# Patient Record
Sex: MALE | Race: WHITE | NOT HISPANIC OR LATINO | Employment: OTHER | URBAN - METROPOLITAN AREA
[De-identification: names, ages, dates, MRNs, and addresses within clinical notes are randomized per-mention and may not be internally consistent; named-entity substitution may affect disease eponyms.]

---

## 2022-11-01 ENCOUNTER — TRANSFERRED RECORDS (OUTPATIENT)
Dept: HEALTH INFORMATION MANAGEMENT | Facility: CLINIC | Age: 57
End: 2022-11-01

## 2022-11-08 ENCOUNTER — MEDICAL CORRESPONDENCE (OUTPATIENT)
Dept: HEALTH INFORMATION MANAGEMENT | Facility: CLINIC | Age: 57
End: 2022-11-08

## 2022-11-10 ENCOUNTER — TRANSCRIBE ORDERS (OUTPATIENT)
Dept: OTHER | Age: 57
End: 2022-11-10

## 2022-11-10 DIAGNOSIS — R13.14 PHARYNGOESOPHAGEAL DYSPHAGIA: Primary | ICD-10-CM

## 2022-11-10 DIAGNOSIS — T27.0XXS: ICD-10-CM

## 2023-02-13 ENCOUNTER — PATIENT OUTREACH (OUTPATIENT)
Dept: OTOLARYNGOLOGY | Facility: CLINIC | Age: 58
End: 2023-02-13
Payer: COMMERCIAL

## 2023-02-13 NOTE — PROGRESS NOTES
Tried to call patient to offer an appointment for tomorrow, as we received an urgent referral. Unfortunately the patient does not have a voicemail or MyChart. Will trying calling again. Viridiana White RN on 2/13/2023 at 3:53 PM

## 2023-02-17 ENCOUNTER — PATIENT OUTREACH (OUTPATIENT)
Dept: OTOLARYNGOLOGY | Facility: CLINIC | Age: 58
End: 2023-02-17
Payer: COMMERCIAL

## 2023-02-17 NOTE — PROGRESS NOTES
Tried contacting patient as a follow up from a referral. Offered him an appointment on 2/23 at 4:00 pm and asked him to give a callback to schedule or find a different appointment. Give phone number. Viridiana White RN on 2/17/2023 at 10:48 AM

## 2023-02-20 NOTE — TELEPHONE ENCOUNTER
FUTURE VISIT INFORMATION      FUTURE VISIT INFORMATION:    Date: 2/23/23    Time: 4pm    Location: [facility]  REFERRAL INFORMATION:    Referring provider:  Scotty Castillo MD    [facility]     Reason for visit/diagnosis  Pharyngoesophageal dysphagia Burn of larynx, sequela    RECORDS REQUESTED FROM:       Clinic name Comments Records Status Imaging Status   [facility] [place] 2/10/23- note with Maria A Peña MD [facility]     [facility] 11/22/22- [place] Video Swallow study   11/1/22- [place] Esophagram   9/27/22- XR Portable chest   9/23/22- CT Chest     Procedure:  2/8/23- Nikki Esophagogastroduodenoscopy     Office visit:  11/22/22- note with Melisa Morris SLP  11/18/22- Note with Miriam Valero MD  11/8/22- note with Scotty Castillo MD  10/24/22- note with  Kevan Vargas M, MD  9/23/22-  ED [place] MD CE 2/20/23- pending req  -PACS                             February 20, 2023 9:37 AM Called [facility] to push images to [place] PACS- Oliva   February 20, 2023 10:10 AM - Received image in pacs and attached it to patient- Oliva

## 2023-02-23 ENCOUNTER — VIRTUAL VISIT (OUTPATIENT)
Dept: OTOLARYNGOLOGY | Facility: CLINIC | Age: 58
End: 2023-02-23
Payer: COMMERCIAL

## 2023-02-23 ENCOUNTER — TELEPHONE (OUTPATIENT)
Dept: OTOLARYNGOLOGY | Facility: CLINIC | Age: 58
End: 2023-02-23

## 2023-02-23 ENCOUNTER — PRE VISIT (OUTPATIENT)
Dept: OTOLARYNGOLOGY | Facility: CLINIC | Age: 58
End: 2023-02-23

## 2023-02-23 DIAGNOSIS — R13.14 PHARYNGOESOPHAGEAL DYSPHAGIA: Primary | ICD-10-CM

## 2023-02-23 PROCEDURE — 99204 OFFICE O/P NEW MOD 45 MIN: CPT | Mod: VID | Performed by: OTOLARYNGOLOGY

## 2023-02-23 NOTE — PROGRESS NOTES
Derrell is a 57 year old who is being evaluated via a billable video visit.      How would you like to obtain your AVS? MyChart  If the video visit is dropped, the invitation should be resent by: Text to cell phone: [phone]  Will anyone else be joining your video visit? No        Video-Visit Details    Type of service:  Video Visit   Video Start Time: 2:30  Video End Time:2:45    Originating Location (pt. Location): Home    Distant Location (provider location):  Off-site  Platform used for Video Visit: [facility]   at the [facility]   Otolaryngology Clinic     Patient: Derrell Beebe    MRN: [id number]    : 1965    Age/Gender: 57 year old male  Date of Service: 2023  Rendering Provider:   Diana Oviedo MD     Referring Provider   PCP: No Ref-Primary, Physician  Referring Physician: No referring provider defined for this encounter.  Reason for Consultation   Caustic ingestion (KOH)  Esophageal stricture  Dysphaiga  History   HISTORY OF PRESENT ILLNESS: I was asked to consult on Derrell Beebe, by Dr. Peña for evaluation of dysphagia . Mr. Beebe is a 57 year old male who presents to us today with dysphagia in the setting of esophageal stricture of caustic ingestion.      Of note,     Today, he, presents for evaluation. he reports:  - 2022  Injury  Dilation at the [facility]  Did see speech therapy and got exercises  He can sip water  He was able to eat after the last dilation - was eatin foods  Water is no longer slipping down the wrong the tube  The longer he goes between dilations the harder it gets  He has to spit up his saliva   Last dilation was a week past last Wednesday  Another one tomorrow  4-5 days he was able to eat one meal per day   Was going well through the top part   A few times with the bread it went ok  He is on TPN   He was able to swallow his saliva   Now he can barely swallow water  Dr Jordan   The voice  changed a bit  Breathing is good     PAST MEDICAL HISTORY: No past medical history on file.    PAST SURGICAL HISTORY: No past surgical history on file.    CURRENT MEDICATIONS: No current outpatient medications on file.    ALLERGIES: Patient has no known allergies.    SOCIAL HISTORY:    Social History     Socioeconomic History     Marital status: Single     Spouse name: Not on file     Number of children: Not on file     Years of education: Not on file     Highest education level: Not on file   Occupational History     Not on file   Tobacco Use     Smoking status: Not on file     Smokeless tobacco: Not on file   Substance and Sexual Activity     Alcohol use: Not on file     Drug use: Not on file     Sexual activity: Not on file   Other Topics Concern     Not on file   Social History Narrative     Not on file     Social Determinants of Health     Financial Resource Strain: Not on file   Food Insecurity: Not on file   Transportation Needs: Not on file   Physical Activity: Not on file   Stress: Not on file   Social Connections: Not on file   Intimate Partner Violence: Not on file   Housing Stability: Not on file         FAMILY HISTORY: No family history on file.  Non-contributory for problems with anesthesia    REVIEW OF SYSTEMS:   The patient was asked a 14 point review of systems regarding constitutional symptoms, eye symptoms, ears, nose, mouth, throat symptoms, cardiovascular symptoms, respiratory symptoms, gastrointestinal symptoms, genitourinary symptoms, musculoskeletal symptoms, integumentary symptoms, neurological symptoms, psychiatric symptoms, endocrine symptoms, hematologic/lymphatic symptoms, and allergic/ immunologic symptoms.   The pertinent factors have been included in the HPI and below.  Patient Supplied Answers to Review of Systems  No flowsheet data found.    Physical Examination   The patient underwent a physical examination as described below. The pertinent positive and negative findings are  summarized after the description of the examination.    Constitutional: The patient's developmental and nutritional status was assessed. The patient's voice quality was assessed.  Head and Face: The head and face were inspected for deformities. Facial muscle strength was assessed bilaterally.  Eyes: Extraocular movements and primary gaze alignment were assessed.  Ears, Nose, Mouth and Throat: The ears and nose were examined for deformities.The lips were examined for abnormalities.   Neck: The neck was visualized for abnormal neck masses  Respiratory: The nature of the breathing was observed.  Extremities: The hand extremities were examined for any clubbing or cyanosis.  Skin: The skin was examined for inflammatory or neoplastic conditions.  Neurologic: The patient's orientation, mood, and affect were noted. The cranial nerve  functions were examined.  Other pertinent positive and negative findings on physical examination:   Breathing comfortably on room air, no stridor with deep inspiration  no throat clearing throughout the visit   Spitting up saliva    All other physical examination findings were within normal limits and noncontributory     Review of Relevant Clinical Data   I personally reviewed:  Notes:   [facility]. Peña 2[facility]10/23  [facility] 57[facility] seen for evaluation of caustic ingestion of lye (KOH) with burns to the esophagus and the upper aerodigestive tract. This happened in September of this year. Patient apparently missed took a glass with clear liquids for a glass of water and accidentally drank it, immediately throwing it back up. He was not seen for 3 days later. Patient has been seen by GI and has had dilation. He has a severe stricture distally. There was concerned because he is on TPN currently unable to swallow only liquids. He feels that he may be aspirating. He has not had a swallow study through speech therapy. He feels that his voice is back to baseline although initially  he was very hoarse. He did not require tracheotomy. He is not tried solid foods since the incident. When he swallows, he feels he has to attempt 5 or 6 times before he can get food to go down the back of his throat. He did not have this problem previous to the ingestion.     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  Multiple benign-appearing, intrinsic severe stenoses   were found in the entire esophagus. The stenoses were   traversed. A guide wire was placed, then the scope   was withdrawn. Using the wire as a guide, dilation   with a 12-13.5-15 mm balloon dilator was performed to   13.5 mm under fluoroscopic guidance.     Radiology:   Xray Video Swallow Exam 22    Pathology:    Procedures:     Labs:  No results found for: TSH  No results found for: NA, CO2, BUN, CREAT, GLUCOSE, PHOS  No results found for: WBC, HGB, HCT, MCV, PLT  No results found for: PT, PTT, INR  No results found for: HENRY  No components found for: RHEUMATOIDFACTOR,  RF  No results found for: CRP  No components found for: CKTOT, URICACID  No components found for: C3, C4, DSDNAAB, NDNAABIFA  No results found for: MPOAB    Patient reported Quality of Life (QOL) Measures   Patient Supplied Answers To VHI Questionnaire  No flowsheet data found.      Patient Supplied Answers To EAT Questionnaire  No flowsheet data found.      Patient Supplied Answers To CSI Questionnaire  No flowsheet data found.      Patient Supplied Answers to Dyspnea Index Questionnaire:  No flowsheet data found.    Impression & Plan     IMPRESSION: Mr. Beebe is a 57 year old male who is being seen for the followin. Dysphagia  - in the setting of caustic ingestion fall of   - severe esophageal stricture distally per Dr Peña notes  - on TPN  - reports getting EGDs with dilation every other week  - after the last dilation was able to eat foods for 4-5 days  - now back to having issues and spitting up saliva  - had Xray Video  Swallow Exam 11/2022 with pharyngeal weakness and question of UES dysfunction   - discussed obtaining repeat Xray Video Swallow Exam with esophagram after tomorrow's repeat dilation  Plan  - Xray Video Swallow Exam with esophagram next week  - obtain records of dilations    2. Dysphonia  - scope by Dr Peña showed - pooling of saliva, right vocal fold paresis, OP scarring  - voice is slightly changed from normal  - does not bother him  - denies breathing issues  Plan  - needs scope       RETURN VISIT: 3/7 at 10am    Thank you for the kind referral and for allowing me to share in the care of Mr. Beebe. If you have any questions, please do not hesitate to contact me.    Diana Oviedo MD    Laryngology    [facility]  Department of  Otolaryngology - Head and Neck Surgery  [facility] [place] [address]  Appointment line: [phone]  Fax: [phone]      30 minutes spent on the date of the encounter doing chart review, interpretation of tests, patient visit and documentation

## 2023-02-23 NOTE — LETTER
2023       RE: Derrell Beebe  [address] [facility] [address]     Dear Colleague,    Thank you for referring your patient, Derrell Beebe, to the [facility] EAR NOSE AND THROAT CLINIC [place] [facility]. Please see a copy of my visit note below.    Derrell is a 57 year old who is being evaluated via a billable video visit.      How would you like to obtain your AVS? MyChart  If the video visit is dropped, the invitation should be resent by: Text to cell phone: [phone]  Will anyone else be joining your video visit? No        Video-Visit Details    Type of service:  Video Visit   Video Start Time: 2:30  Video End Time:2:45    Originating Location (pt. Location): Home    Distant Location (provider location):  Off-site  Platform used for Video Visit: [facility]   at the [facility]   Otolaryngology Clinic     Patient: Derrell Beebe    MRN: [id number]    : 1965    Age/Gender: 57 year old male  Date of Service: 2023  Rendering Provider:   Diana Oviedo MD     Referring Provider   PCP: No Ref-Primary, Physician  Referring Physician: No referring provider defined for this encounter.  Reason for Consultation   Caustic ingestion (KOH)  Esophageal stricture  Dysphaiga  History   HISTORY OF PRESENT ILLNESS: I was asked to consult on Derrell Beebe, by Dr. Peña for evaluation of dysphagia . Mr. Beebe is a 57 year old male who presents to us today with dysphagia in the setting of esophageal stricture of caustic ingestion.      Of note,     Today, he, presents for evaluation. he reports:  - 2022  Injury  Dilation at the [facility]  Did see speech therapy and got exercises  He can sip water  He was able to eat after the last dilation - was eatin foods  Water is no longer slipping down the wrong the tube  The longer he goes between dilations the harder it gets  He  has to spit up his saliva   Last dilation was a week past last Wednesday  Another one tomorrow  4-5 days he was able to eat one meal per day   Was going well through the top part   A few times with the bread it went ok  He is on TPN   He was able to swallow his saliva   Now he can barely swallow water  Dr Jordan   The voice changed a bit  Breathing is good     PAST MEDICAL HISTORY: No past medical history on file.    PAST SURGICAL HISTORY: No past surgical history on file.    CURRENT MEDICATIONS: No current outpatient medications on file.    ALLERGIES: Patient has no known allergies.    SOCIAL HISTORY:    Social History     Socioeconomic History     Marital status: Single     Spouse name: Not on file     Number of children: Not on file     Years of education: Not on file     Highest education level: Not on file   Occupational History     Not on file   Tobacco Use     Smoking status: Not on file     Smokeless tobacco: Not on file   Substance and Sexual Activity     Alcohol use: Not on file     Drug use: Not on file     Sexual activity: Not on file   Other Topics Concern     Not on file   Social History Narrative     Not on file     Social Determinants of Health     Financial Resource Strain: Not on file   Food Insecurity: Not on file   Transportation Needs: Not on file   Physical Activity: Not on file   Stress: Not on file   Social Connections: Not on file   Intimate Partner Violence: Not on file   Housing Stability: Not on file         FAMILY HISTORY: No family history on file.  Non-contributory for problems with anesthesia    REVIEW OF SYSTEMS:   The patient was asked a 14 point review of systems regarding constitutional symptoms, eye symptoms, ears, nose, mouth, throat symptoms, cardiovascular symptoms, respiratory symptoms, gastrointestinal symptoms, genitourinary symptoms, musculoskeletal symptoms, integumentary symptoms, neurological symptoms, psychiatric symptoms, endocrine symptoms, hematologic/lymphatic  symptoms, and allergic/ immunologic symptoms.   The pertinent factors have been included in the HPI and below.  Patient Supplied Answers to Review of Systems  No flowsheet data found.    Physical Examination   The patient underwent a physical examination as described below. The pertinent positive and negative findings are summarized after the description of the examination.    Constitutional: The patient's developmental and nutritional status was assessed. The patient's voice quality was assessed.  Head and Face: The head and face were inspected for deformities. Facial muscle strength was assessed bilaterally.  Eyes: Extraocular movements and primary gaze alignment were assessed.  Ears, Nose, Mouth and Throat: The ears and nose were examined for deformities.The lips were examined for abnormalities.   Neck: The neck was visualized for abnormal neck masses  Respiratory: The nature of the breathing was observed.  Extremities: The hand extremities were examined for any clubbing or cyanosis.  Skin: The skin was examined for inflammatory or neoplastic conditions.  Neurologic: The patient's orientation, mood, and affect were noted. The cranial nerve  functions were examined.  Other pertinent positive and negative findings on physical examination:   Breathing comfortably on room air, no stridor with deep inspiration  no throat clearing throughout the visit   Spitting up saliva    All other physical examination findings were within normal limits and noncontributory     Review of Relevant Clinical Data   I personally reviewed:  Notes:   Dr. Peña 2/10/23  [facility]  Derrell is a 57-year-old gentleman seen for evaluation of caustic ingestion of lye (KOH) with burns to the esophagus and the upper aerodigestive tract. This happened in September of this year. Patient apparently missed took a glass with clear liquids for a glass of water and accidentally drank it, immediately throwing it back up. He was not seen for 3 days later.  Patient has been seen by GI and has had dilation. He has a severe stricture distally. There was concerned because he is on TPN currently unable to swallow only liquids. He feels that he may be aspirating. He has not had a swallow study through speech therapy. He feels that his voice is back to baseline although initially he was very hoarse. He did not require tracheotomy. He is not tried solid foods since the incident. When he swallows, he feels he has to attempt 5 or 6 times before he can get food to go down the back of his throat. He did not have this problem previous to the ingestion.     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  Multiple benign-appearing, intrinsic severe stenoses   were found in the entire esophagus. The stenoses were   traversed. A guide wire was placed, then the scope   was withdrawn. Using the wire as a guide, dilation   with a 12-13.5-15 mm balloon dilator was performed to   13.5 mm under fluoroscopic guidance.     Radiology:   Xray Video Swallow Exam 11/22/22    Pathology:    Procedures:     Labs:  No results found for: TSH  No results found for: NA, CO2, BUN, CREAT, GLUCOSE, PHOS  No results found for: WBC, HGB, HCT, MCV, PLT  No results found for: PT, PTT, INR  No results found for: HENRY  No components found for: RHEUMATOIDFACTOR,  RF  No results found for: CRP  No components found for: CKTOT, URICACID  No components found for: C3, C4, DSDNAAB, NDNAABIFA  No results found for: MPOAB    Patient reported Quality of Life (QOL) Measures   Patient Supplied Answers To VHI Questionnaire  No flowsheet data found.      Patient Supplied Answers To EAT Questionnaire  No flowsheet data found.      Patient Supplied Answers To CSI Questionnaire  No flowsheet data found.      Patient Supplied Answers to Dyspnea Index Questionnaire:  No flowsheet data found.    Impression & Plan     IMPRESSION: Mr. Beebe is a 57 year old male who is being seen for the  followin. Dysphagia  - in the setting of caustic ingestion   - severe esophageal stricture distally per Dr Peña notes  - on TPN  - reports getting EGDs with dilation every other week  - after the last dilation was able to eat foods for 4-5 days  - now back to having issues and spitting up saliva  - had Xray Video Swallow Exam 2022 with pharyngeal weakness and question of UES dysfunction   - discussed obtaining repeat Xray Video Swallow Exam with esophagram after tomorrow's repeat dilation  Plan  - Xray Video Swallow Exam with esophagram next week  - obtain records of dilations    2. Dysphonia  - scope by Dr Peña showed - pooling of saliva, right vocal fold paresis, OP scarring  - voice is slightly changed from normal  - does not bother him  - denies breathing issues  Plan  - needs scope       RETURN VISIT: 3/7 at 10am    Thank you for the kind referral and for allowing me to share in the care of Mr. Beebe. If you have any questions, please do not hesitate to contact me.    Diana Oviedo MD    Laryngology    [facility]  Department of  Otolaryngology - Head and Neck Surgery  Clinics & Surgery Center  [address] [place] [address]  Appointment line: [phone]  Fax: [phone]      30 minutes spent on the date of the encounter doing chart review, interpretation of tests, patient visit and documentation        Again, thank you for allowing me to participate in the care of your patient.      Sincerely,    Diana Oviedo MD

## 2023-02-23 NOTE — PATIENT INSTRUCTIONS
1.  You were seen in the ENT Clinic today by . If you have any questions or concerns after your appointment, please call [phone]. Press option #1 for scheduling related needs. Press option #3 for Nurse advice.    2.   has recommended  the following:   - Xray video swallow and esophagram. If possible next week    3.  Plan is to return to clinic 3/7/23 at 10 am for scope exam      Maile Tom LPN  [phone]  [facility] Otolaryngology

## 2023-02-23 NOTE — LETTER
Date:February 24, 2023      Patient was self referred, no letter generated. Do not send.        [facility] Health Information

## 2023-02-23 NOTE — TELEPHONE ENCOUNTER
Records Requested     February 23, 2023 3:04 PM   Oliva    [facility]    Outcome Sent a request for all the EDG Records with dilations to be fax to us         2/24/23 11:03am- [facility] said this is not one of their Patient and they do not have records on him.   2/24/23 2:31pm - faxed a request to [facility] to send report to us- oliva   2/27/23 12:49pm- called [facility] to see if they got my request. Orly said she will let the Medical records know and rush the order.- Oliva   2/28/23 3:58pm - received the edg records and sent it to Codie- Oliva

## 2023-02-24 ENCOUNTER — TRANSFERRED RECORDS (OUTPATIENT)
Dept: HEALTH INFORMATION MANAGEMENT | Facility: CLINIC | Age: 58
End: 2023-02-24
Payer: COMMERCIAL

## 2023-03-03 ENCOUNTER — TELEPHONE (OUTPATIENT)
Dept: SPEECH THERAPY | Facility: CLINIC | Age: 58
End: 2023-03-03
Payer: COMMERCIAL

## 2023-03-03 NOTE — TELEPHONE ENCOUNTER
Zane has a video swallow study coming up on Monday 3/6/23. He underwent esophageal dilation with stent placement her his report earlier this week. He feels like swallowing has been going really well since the stent was placed. He is on thin liquids and soft foods such as mashed potatoes, applesauce, and yogurt.  He feels like swallowing has been going really well.  He will present for the swallow study on Monday.  He notes usually the swallowing gets challenging after couple of days but this did not seem to really have helped.  Provided information on location of evaluation. He will reach out with questions or concerns prior to evaluation.       Shivani Boykin MS, CCC-SLP  Speech-Language Pathology  [facility] [place]  Department of Otolaryngology/D&T - 4th floor  Phone: [phone]  Email: [email or web address]

## 2023-03-06 ENCOUNTER — THERAPY VISIT (OUTPATIENT)
Dept: SPEECH THERAPY | Facility: CLINIC | Age: 58
End: 2023-03-06
Payer: COMMERCIAL

## 2023-03-06 ENCOUNTER — ANCILLARY PROCEDURE (OUTPATIENT)
Dept: GENERAL RADIOLOGY | Facility: CLINIC | Age: 58
End: 2023-03-06
Attending: OTOLARYNGOLOGY
Payer: COMMERCIAL

## 2023-03-06 DIAGNOSIS — R13.12 DYSPHAGIA, OROPHARYNGEAL PHASE: Primary | ICD-10-CM

## 2023-03-06 DIAGNOSIS — R13.14 PHARYNGOESOPHAGEAL DYSPHAGIA: ICD-10-CM

## 2023-03-06 PROCEDURE — 74230 X-RAY XM SWLNG FUNCJ C+: CPT | Mod: GC | Performed by: STUDENT IN AN ORGANIZED HEALTH CARE EDUCATION/TRAINING PROGRAM

## 2023-03-06 PROCEDURE — 92611 MOTION FLUOROSCOPY/SWALLOW: CPT | Mod: GN | Performed by: SPEECH-LANGUAGE PATHOLOGIST

## 2023-03-06 RX ORDER — BARIUM SULFATE 400 MG/ML
30 SUSPENSION ORAL ONCE
Status: COMPLETED | OUTPATIENT
Start: 2023-03-06 | End: 2023-03-06

## 2023-03-06 RX ADMIN — BARIUM SULFATE 30 ML: 400 SUSPENSION ORAL at 14:10

## 2023-03-07 ENCOUNTER — THERAPY VISIT (OUTPATIENT)
Dept: SPEECH THERAPY | Facility: CLINIC | Age: 58
End: 2023-03-07
Payer: COMMERCIAL

## 2023-03-07 ENCOUNTER — DOCUMENTATION ONLY (OUTPATIENT)
Dept: OTOLARYNGOLOGY | Facility: CLINIC | Age: 58
End: 2023-03-07

## 2023-03-07 ENCOUNTER — OFFICE VISIT (OUTPATIENT)
Dept: OTOLARYNGOLOGY | Facility: CLINIC | Age: 58
End: 2023-03-07
Payer: COMMERCIAL

## 2023-03-07 VITALS
OXYGEN SATURATION: 100 % | DIASTOLIC BLOOD PRESSURE: 69 MMHG | WEIGHT: 166 LBS | HEART RATE: 80 BPM | SYSTOLIC BLOOD PRESSURE: 116 MMHG

## 2023-03-07 DIAGNOSIS — R13.14 PHARYNGOESOPHAGEAL DYSPHAGIA: Primary | ICD-10-CM

## 2023-03-07 DIAGNOSIS — R13.12 DYSPHAGIA, OROPHARYNGEAL PHASE: ICD-10-CM

## 2023-03-07 PROCEDURE — 31575 DIAGNOSTIC LARYNGOSCOPY: CPT | Performed by: OTOLARYNGOLOGY

## 2023-03-07 PROCEDURE — 99207 PR NO CHARGE LOS: CPT | Performed by: SPEECH-LANGUAGE PATHOLOGIST

## 2023-03-07 ASSESSMENT — PAIN SCALES - GENERAL: PAINLEVEL: NO PAIN (0)
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
## 2023-03-07 NOTE — PROGRESS NOTES
Pt was seen today by speech therapy in conjunction with ENT visit. SLP reviewed with MD results of video swallow study. Pt and MD discussed treatment plan and plan for assessment of UES and possible dilation. Discussed food options for mech/soft, thin liquids. Discussed possible transition to tube feeding or oral intake through use of boost, ensure and meals. Dr Oviedo reports that she will reach out to Pts current GI and discuss transition. Pt continues to demonstrate a safe swallow. Plan for re-evaluation of swallow after dilation as comleted.         Gayle Tony MS, CCC-SLP  Speech-Language Pathology  [facility]  Department of Otolaryngology/D&T - 4th floor  Phone: [phone]  Email: [email or web address]

## 2023-03-07 NOTE — LETTER
Date:March 8, 2023      Patient was self referred, no letter generated. Do not send.        [facility] Health Information

## 2023-03-07 NOTE — PATIENT INSTRUCTIONS
1.  You were seen in the ENT Clinic today by Dr. Oviedo. If you have any questions or concerns after your appointment, please call [phone]. Press option #1 for scheduling related needs. Press option #3 for Nurse advice.    2.  Dr. Oviedo has recommended the following:   - Lower ERCP with Dr. Oviedo and GI     3.  Plan is to return to clinic to be determined once we get a surgery date      Viridiana Christopher  [phone]  [facility] - Otolaryngology
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
## 2023-03-08 ENCOUNTER — PREP FOR PROCEDURE (OUTPATIENT)
Dept: OTOLARYNGOLOGY | Facility: CLINIC | Age: 58
End: 2023-03-08
Payer: COMMERCIAL

## 2023-03-08 ENCOUNTER — TELEPHONE (OUTPATIENT)
Dept: OTOLARYNGOLOGY | Facility: CLINIC | Age: 58
End: 2023-03-08
Payer: COMMERCIAL

## 2023-03-08 DIAGNOSIS — K22.2 ESOPHAGEAL STENOSIS: Primary | ICD-10-CM

## 2023-03-08 NOTE — TELEPHONE ENCOUNTER
Scheduled patient for combo surgery on 3/15 per Dr. Lugo request    July Stein, Perioperative Coordinator, ENT 3/8/2023 at 2:32 PM
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
## 2023-03-10 NOTE — TELEPHONE ENCOUNTER
Called patient to schedule surgery with Dr. Oviedo    Date of Surgery: 3/15    Location of surgery: [facility]    Pre-Op H&P: NP Clinic scheduled    Pre/Post Imaging:  Not Applicable    Discussed COVID-19 Testing: Not Applicable    Post-Op Appt Date: 4 weeks    Surgery Packet Mailed: raffaele      Additional comments: PASHA Stein on 3/10/2023 at 4:00 PM

## 2023-03-13 ENCOUNTER — OFFICE VISIT (OUTPATIENT)
Dept: FAMILY MEDICINE | Facility: CLINIC | Age: 58
End: 2023-03-13
Payer: COMMERCIAL

## 2023-03-13 VITALS
OXYGEN SATURATION: 100 % | HEART RATE: 79 BPM | SYSTOLIC BLOOD PRESSURE: 126 MMHG | DIASTOLIC BLOOD PRESSURE: 69 MMHG | TEMPERATURE: 97.8 F | WEIGHT: 168 LBS | BODY MASS INDEX: 24.05 KG/M2 | HEIGHT: 70 IN

## 2023-03-13 DIAGNOSIS — K43.9 VENTRAL HERNIA WITHOUT OBSTRUCTION OR GANGRENE: ICD-10-CM

## 2023-03-13 DIAGNOSIS — Z01.818 PRE-OP EXAM: Primary | ICD-10-CM

## 2023-03-13 DIAGNOSIS — Z23 ENCOUNTER FOR IMMUNIZATION: ICD-10-CM

## 2023-03-13 NOTE — NURSING NOTE
"ROOM:2  SOILA FELIX    Preferred Name: Derrell     How did you hear about us?  Other - Call Center    57 year old  Chief Complaint   Patient presents with     Pre-Op Exam       Blood pressure 126/69, pulse 79, temperature 97.8  F (36.6  C), temperature source Oral, height 1.778 m (5' 10\"), weight 76.2 kg (168 lb), SpO2 100 %. Body mass index is 24.11 kg/m .  BP completed using cuff size:        There is no problem list on file for this patient.      Wt Readings from Last 2 Encounters:   03/13/23 76.2 kg (168 lb)   03/07/23 75.3 kg (166 lb)     BP Readings from Last 3 Encounters:   03/13/23 126/69   03/07/23 116/69   02/10/23 115/77       No Known Allergies    No current outpatient medications on file.     No current facility-administered medications for this visit.       Social History     Tobacco Use     Smoking status: Every Day     Types: Cigarettes     Smokeless tobacco: Never       Honoring Choices - Health Care Directive Guide offered to patient at time of visit.    Health Maintenance Due   Topic Date Due     YEARLY PREVENTIVE VISIT  Never done     ADVANCE CARE PLANNING  Never done     HEPATITIS B IMMUNIZATION (1 of 3 - 3-dose series) Never done     COVID-19 Vaccine (1) Never done     COLORECTAL CANCER SCREENING  Never done     HIV SCREENING  Never done     HEPATITIS C SCREENING  Never done     LIPID  Never done     ZOSTER IMMUNIZATION (1 of 2) Never done     DTAP/TDAP/TD IMMUNIZATION (2 - Td or Tdap) 03/17/2018     INFLUENZA VACCINE (1) 09/01/2022         There is no immunization history on file for this patient.    No results found for: PAP    No lab results found.    PHQ-2 ( 1999 Pfizer) 3/7/2023   Q1: Little interest or pleasure in doing things 0   Q2: Feeling down, depressed or hopeless 0   PHQ-2 Score 0   Q1: Little interest or pleasure in doing things Not at all   Q2: Feeling down, depressed or hopeless Not at all   PHQ-2 Score 0       No flowsheet data found.    No flowsheet data found.    No " flowsheet data found.    Juaquin Estrada    March 13, 2023 9:39 AM
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
## 2023-03-15 ENCOUNTER — ANESTHESIA EVENT (OUTPATIENT)
Dept: SURGERY | Facility: CLINIC | Age: 58
End: 2023-03-15
Payer: COMMERCIAL

## 2023-03-15 ENCOUNTER — ANESTHESIA (OUTPATIENT)
Dept: SURGERY | Facility: CLINIC | Age: 58
End: 2023-03-15
Payer: COMMERCIAL

## 2023-03-15 ENCOUNTER — HOSPITAL ENCOUNTER (OUTPATIENT)
Facility: CLINIC | Age: 58
Discharge: HOME | End: 2023-03-15
Attending: OTOLARYNGOLOGY | Admitting: OTOLARYNGOLOGY
Payer: COMMERCIAL

## 2023-03-15 VITALS
BODY MASS INDEX: 22.75 KG/M2 | HEART RATE: 73 BPM | HEIGHT: 72 IN | RESPIRATION RATE: 18 BRPM | OXYGEN SATURATION: 100 % | DIASTOLIC BLOOD PRESSURE: 70 MMHG | WEIGHT: 167.99 LBS | TEMPERATURE: 98.2 F | SYSTOLIC BLOOD PRESSURE: 117 MMHG

## 2023-03-15 LAB
GLUCOSE BLDC GLUCOMTR-MCNC: 91 MG/DL (ref 70–99)
UPPER GI ENDOSCOPY: NORMAL

## 2023-03-15 PROCEDURE — 258N000003 HC RX IP 258 OP 636: Performed by: NURSE ANESTHETIST, CERTIFIED REGISTERED

## 2023-03-15 PROCEDURE — 999N000141 HC STATISTIC PRE-PROCEDURE NURSING ASSESSMENT: Performed by: OTOLARYNGOLOGY

## 2023-03-15 PROCEDURE — 82962 GLUCOSE BLOOD TEST: CPT

## 2023-03-15 PROCEDURE — 710N000012 HC RECOVERY PHASE 2, PER MINUTE: Performed by: OTOLARYNGOLOGY

## 2023-03-15 PROCEDURE — 250N000009 HC RX 250: Performed by: NURSE ANESTHETIST, CERTIFIED REGISTERED

## 2023-03-15 PROCEDURE — 250N000011 HC RX IP 250 OP 636: Performed by: NURSE ANESTHETIST, CERTIFIED REGISTERED

## 2023-03-15 PROCEDURE — 370N000017 HC ANESTHESIA TECHNICAL FEE, PER MIN: Performed by: OTOLARYNGOLOGY

## 2023-03-15 PROCEDURE — 710N000009 HC RECOVERY PHASE 1, LEVEL 1, PER MIN: Performed by: OTOLARYNGOLOGY

## 2023-03-15 PROCEDURE — 272N000001 HC OR GENERAL SUPPLY STERILE: Performed by: OTOLARYNGOLOGY

## 2023-03-15 PROCEDURE — 360N000075 HC SURGERY LEVEL 2, PER MIN: Performed by: OTOLARYNGOLOGY

## 2023-03-15 PROCEDURE — 31526 DX LARYNGOSCOPY W/OPER SCOPE: CPT | Mod: GC | Performed by: OTOLARYNGOLOGY

## 2023-03-15 PROCEDURE — 250N000025 HC SEVOFLURANE, PER MIN: Performed by: OTOLARYNGOLOGY

## 2023-03-15 PROCEDURE — 250N000009 HC RX 250: Performed by: OTOLARYNGOLOGY

## 2023-03-15 PROCEDURE — 43200 ESOPHAGOSCOPY FLEXIBLE BRUSH: CPT | Mod: 51 | Performed by: OTOLARYNGOLOGY

## 2023-03-15 RX ORDER — HYDROMORPHONE HCL IN WATER/PF 6 MG/30 ML
0.4 PATIENT CONTROLLED ANALGESIA SYRINGE INTRAVENOUS EVERY 5 MIN PRN
Status: CANCELLED | OUTPATIENT
Start: 2023-03-15

## 2023-03-15 RX ORDER — SODIUM CHLORIDE, SODIUM LACTATE, POTASSIUM CHLORIDE, CALCIUM CHLORIDE 600; 310; 30; 20 MG/100ML; MG/100ML; MG/100ML; MG/100ML
INJECTION, SOLUTION INTRAVENOUS CONTINUOUS PRN
Status: DISCONTINUED | OUTPATIENT
Start: 2023-03-15 | End: 2023-03-15

## 2023-03-15 RX ORDER — SODIUM CHLORIDE, SODIUM LACTATE, POTASSIUM CHLORIDE, CALCIUM CHLORIDE 600; 310; 30; 20 MG/100ML; MG/100ML; MG/100ML; MG/100ML
INJECTION, SOLUTION INTRAVENOUS CONTINUOUS
Status: DISCONTINUED | OUTPATIENT
Start: 2023-03-15 | End: 2023-03-15 | Stop reason: HOSPADM

## 2023-03-15 RX ORDER — FENTANYL CITRATE 50 UG/ML
INJECTION, SOLUTION INTRAMUSCULAR; INTRAVENOUS PRN
Status: DISCONTINUED | OUTPATIENT
Start: 2023-03-15 | End: 2023-03-15

## 2023-03-15 RX ORDER — PROPOFOL 10 MG/ML
INJECTION, EMULSION INTRAVENOUS PRN
Status: DISCONTINUED | OUTPATIENT
Start: 2023-03-15 | End: 2023-03-15

## 2023-03-15 RX ORDER — DEXAMETHASONE SODIUM PHOSPHATE 4 MG/ML
INJECTION, SOLUTION INTRA-ARTICULAR; INTRALESIONAL; INTRAMUSCULAR; INTRAVENOUS; SOFT TISSUE PRN
Status: DISCONTINUED | OUTPATIENT
Start: 2023-03-15 | End: 2023-03-15

## 2023-03-15 RX ORDER — FENTANYL CITRATE 50 UG/ML
25 INJECTION, SOLUTION INTRAMUSCULAR; INTRAVENOUS EVERY 5 MIN PRN
Status: CANCELLED | OUTPATIENT
Start: 2023-03-15

## 2023-03-15 RX ORDER — LIDOCAINE 40 MG/G
CREAM TOPICAL
Status: DISCONTINUED | OUTPATIENT
Start: 2023-03-15 | End: 2023-03-15 | Stop reason: HOSPADM

## 2023-03-15 RX ORDER — OXYMETAZOLINE HYDROCHLORIDE 0.05 G/100ML
SPRAY NASAL PRN
Status: DISCONTINUED | OUTPATIENT
Start: 2023-03-15 | End: 2023-03-15 | Stop reason: HOSPADM

## 2023-03-15 RX ORDER — ONDANSETRON 2 MG/ML
INJECTION INTRAMUSCULAR; INTRAVENOUS PRN
Status: DISCONTINUED | OUTPATIENT
Start: 2023-03-15 | End: 2023-03-15

## 2023-03-15 RX ORDER — ONDANSETRON 2 MG/ML
4 INJECTION INTRAMUSCULAR; INTRAVENOUS EVERY 30 MIN PRN
Status: CANCELLED | OUTPATIENT
Start: 2023-03-15

## 2023-03-15 RX ORDER — ONDANSETRON 4 MG/1
4 TABLET, ORALLY DISINTEGRATING ORAL EVERY 30 MIN PRN
Status: CANCELLED | OUTPATIENT
Start: 2023-03-15

## 2023-03-15 RX ORDER — FENTANYL CITRATE 50 UG/ML
50 INJECTION, SOLUTION INTRAMUSCULAR; INTRAVENOUS EVERY 5 MIN PRN
Status: CANCELLED | OUTPATIENT
Start: 2023-03-15

## 2023-03-15 RX ORDER — HYDROMORPHONE HCL IN WATER/PF 6 MG/30 ML
0.2 PATIENT CONTROLLED ANALGESIA SYRINGE INTRAVENOUS EVERY 5 MIN PRN
Status: CANCELLED | OUTPATIENT
Start: 2023-03-15

## 2023-03-15 RX ORDER — SODIUM CHLORIDE, SODIUM LACTATE, POTASSIUM CHLORIDE, CALCIUM CHLORIDE 600; 310; 30; 20 MG/100ML; MG/100ML; MG/100ML; MG/100ML
INJECTION, SOLUTION INTRAVENOUS CONTINUOUS
Status: CANCELLED | OUTPATIENT
Start: 2023-03-15

## 2023-03-15 RX ADMIN — SUGAMMADEX 200 MG: 100 INJECTION, SOLUTION INTRAVENOUS at 16:30

## 2023-03-15 RX ADMIN — MIDAZOLAM 2 MG: 1 INJECTION INTRAMUSCULAR; INTRAVENOUS at 15:51

## 2023-03-15 RX ADMIN — FENTANYL CITRATE 100 MCG: 50 INJECTION, SOLUTION INTRAMUSCULAR; INTRAVENOUS at 15:59

## 2023-03-15 RX ADMIN — Medication 10 MG: at 16:15

## 2023-03-15 RX ADMIN — SODIUM CHLORIDE, POTASSIUM CHLORIDE, SODIUM LACTATE AND CALCIUM CHLORIDE: 600; 310; 30; 20 INJECTION, SOLUTION INTRAVENOUS at 15:33

## 2023-03-15 RX ADMIN — PROPOFOL 200 MG: 10 INJECTION, EMULSION INTRAVENOUS at 15:59

## 2023-03-15 RX ADMIN — ONDANSETRON 4 MG: 2 INJECTION INTRAMUSCULAR; INTRAVENOUS at 16:12

## 2023-03-15 RX ADMIN — DEXAMETHASONE SODIUM PHOSPHATE 6 MG: 4 INJECTION, SOLUTION INTRA-ARTICULAR; INTRALESIONAL; INTRAMUSCULAR; INTRAVENOUS; SOFT TISSUE at 15:59

## 2023-03-15 RX ADMIN — Medication 50 MG: at 15:59

## 2023-03-15 ASSESSMENT — ACTIVITIES OF DAILY LIVING (ADL)
ADLS_ACUITY_SCORE: 35
ADLS_ACUITY_SCORE: 35
ADLS_ACUITY_SCORE: 33

## 2023-03-15 ASSESSMENT — LIFESTYLE VARIABLES: TOBACCO_USE: 1

## 2023-03-15 NOTE — ANESTHESIA POSTPROCEDURE EVALUATION
Patient: Derrell Beebe    Procedure: Procedure(s):  DIRECT LARYNGOSCOPY  Diagnostic upper endoscopy       Anesthesia Type:  General    Note:  Disposition: Outpatient   Postop Pain Control: Uneventful            Sign Out: Well controlled pain   PONV: No   Neuro/Psych: Uneventful            Sign Out: Acceptable/Baseline neuro status   Airway/Respiratory: Uneventful            Sign Out: Acceptable/Baseline resp. status   CV/Hemodynamics: Uneventful            Sign Out: Acceptable CV status; No obvious hypovolemia; No obvious fluid overload   Other NRE: NONE   DID A NON-ROUTINE EVENT OCCUR? No           Last vitals:  Vitals Value Taken Time   /73 03/15/23 1643   Temp 36.5  C (97.7  F) 03/15/23 1643   Pulse 70 03/15/23 1721   Resp 16 03/15/23 1721   SpO2 100 % 03/15/23 1721   Vitals shown include unvalidated device data.    Electronically Signed By: Cleveland Kat MD  March 15, 2023  5:22 PM

## 2023-03-15 NOTE — PROGRESS NOTES
Pt eager to leave.  VSS and no complaints voiced.  Discharge instructions discussed and understanding verbalized.  No concerns voiced.

## 2023-03-15 NOTE — DISCHARGE INSTRUCTIONS
Keep follow-up appointments, including with Dr. Ovieod and for your endoscopy tomorrow 3/16/23.    [facility], [place]  Same-Day Surgery   Adult Discharge Orders & Instructions     For 24 hours after surgery    Get plenty of rest.  A responsible adult must stay with you for at least 24 hours after you leave the hospital.   Do not drive or use heavy equipment.  If you have weakness or tingling, don't drive or use heavy equipment until this feeling goes away.  Do not drink alcohol.  Avoid strenuous or risky activities.  Ask for help when climbing stairs.   You may feel lightheaded.  IF so, sit for a few minutes before standing.  Have someone help you get up.   If you have nausea (feel sick to your stomach): Drink only clear liquids such as apple juice, ginger ale, broth or 7-Up.  Rest may also help.  Be sure to drink enough fluids.  Move to a regular diet as you feel able.  You may have a slight fever. Call the doctor if your fever is over 100 F (37.7 C) (taken under the tongue) or lasts longer than 24 hours.  You may have a dry mouth, a sore throat, muscle aches or trouble sleeping.  These should go away after 24 hours.  Do not make important or legal decisions.   Call your doctor for any of the followin.  Signs of infection (fever, growing tenderness at the surgery site, a large amount of drainage or bleeding, severe pain, foul-smelling drainage, redness, swelling).    2. It has been over 8 to 10 hours since surgery and you are still not able to urinate (pass water).    3.  Headache for over 24 hours.    4.  Numbness, tingling or weakness the day after surgery (if you had spinal anesthesia).  To contact a doctor, call Dr. Oviedo at Otolaryngology/ENT clinic @ [phone]  or:    '   [phone] and ask for the resident on call for   Otolaryngology (answered 24 hours a day)  '   Emergency Department:    [facility]: [phone]       (TTY for hearing impaired:  [phone])

## 2023-03-15 NOTE — OP NOTE
Upper GI Endoscopy 03/15/2023  4:29 PM [address] [place] [address] [phone]     Endoscopy Department   _______________________________________________________________________________   Patient Name: Derrell Beebe     Procedure Date: 3/15/2023 4:29 PM   MRN: [id number]                       Account Number: [id number]   YOB: 1965              Admit Type: Outpatient   Age: 57                               Room: [place] [address]   Gender: Male                          Note Status: Finalized   Attending MD: DANDY CLARK MD  Total Sedation Time:   _______________________________________________________________________________       Procedure:             Upper GI endoscopy   Indications:           Follow-up of esophageal stenosis   Providers:             DANDY CLARK MD   Patient Profile:       Mr Beebe is a 56yo gentleman struggling with an                          esophageal stenosis following an accidental lye                          ingestion who proceeds to diagnsotic upper endsocopy                          to assist Dr Oviedo with acute management.   Referring MD:          NOBLE DE LOS SANTOS MD   Requesting Provider:   VERONIKA OVEIDO   Medicines:             General Anesthesia   Complications:         No immediate complications.   _______________________________________________________________________________   Procedure:             Pre-Anesthesia Assessment:                          - Prior to the procedure, a History and Physical was                          performed, and patient medications and allergies were                          reviewed. The patient is competent. The risks and                          benefits of the procedure and the sedation options and                          risks were discussed with the patient. All questions                          were answered and informed consent was obtained.                           Patient identification and proposed procedure were                          verified by the nurse in the pre-procedure area.                          Mental Status Examination: alert and oriented. Airway                          Examination: Mallampati Class II (the uvula but not                          tonsillar pillars visualized). Respiratory                          Examination: clear to auscultation. CV Examination:                          normal. ASA Grade Assessment: II - A patient with mild                          systemic disease. After reviewing the risks and                          benefits, the patient was deemed in satisfactory                          condition to undergo the procedure. The anesthesia                          plan was to use general anesthesia. Immediately prior                          to administration of medications, the patient was                          re-assessed for adequacy to receive sedatives. The                          heart rate, respiratory rate, oxygen saturations,                          blood pressure, adequacy of pulmonary ventilation, and                          response to care were monitored throughout the                          procedure. The physical status of the patient was                          re-assessed after the procedure. After obtaining                          informed consent, the endoscope was passed under                          direct vision. Throughout the procedure, the patient's                          blood pressure, pulse, and oxygen saturations were                          monitored continuously. The pediatric gastroscope was                          introduced through the mouth, and advanced to the                          pylorus. The upper GI endoscopy was accomplished                          without difficulty. The patient tolerated the                          procedure well.                                                                                      Findings:        The patient was under general anesthesia, supine and a pediateric        gastroscope was passed per os. The UES appeared patent though the entire        length of the esophagus appeared stenotic with intact mucosa. The lumen        patency was estimated to be around 5mm and the gastroscope passed        without issue. The stomach was notable for a coverd metal biliary stent        in the antrum.                                                                                     Impression:            - Diffuse benign appearing stenosis from UES to LES                          consistent with known lye ingestion                          - Migration of the biliary stent placed in the                          esophagus to the antrum   Recommendation:        - Recommendations as per Dr Oviedo's team                          - Dr Jordan was phoned during the procedure and asked                          if he would like us to remove the stent; he preferred                          spontaneous passage per rectum is possible and was                          planning to repeat upper endsocopy tomorrow for                          further therapy                          - The findings and recommendations were discussed with                          the patient and their primary physician                                                                                       electronically signed by EDITH Clark   ________________________   DANDY CLARK MD   3/15/2023 4:35:01 PM   I was physically present for the entire viewing portion of the exam.   __________________________   Signature of teaching physician   Gabriel/[id number] CLARK MD   Number of Addenda: 0

## 2023-03-15 NOTE — PROGRESS NOTES
Discharge instructions discussed with patient's son.  All questions answered to satisfaction and no concerns voiced.

## 2023-03-15 NOTE — ANESTHESIA PROCEDURE NOTES
Airway       Patient location during procedure: OR       Procedure Start/Stop Times: 3/15/2023 4:03 PM  Staff -        Anesthesiologist:  Cleveland Kat MD       CRNA: Leeanna Kaiser APRN CRNA       Performed By: CRNAIndications and Patient Condition       Indications for airway management: juliana-procedural       Induction type:intravenous       Mask difficulty assessment: 1 - vent by mask    Final Airway Details       Final airway type: endotracheal airway       Successful airway: ETT - single (Jerry)  Endotracheal Airway Details        ETT size (mm): 6.0       Cuffed: yes       Successful intubation technique: direct laryngoscopy       DL Blade Type: Arteaga 2       Grade View of Cords: 1       Adjucts: stylet       Position: Left       Measured from: gums/teeth       Secured at (cm): 24       Bite block used: None    Post intubation assessment        Placement verified by: capnometry, equal breath sounds and chest rise        Number of attempts at approach: 1       Secured with: pink tape       Ease of procedure: easy       Dentition: Intact and Unchanged    Medication(s) Administered   Medication Administration Time: 3/15/2023 4:03 PM

## 2023-03-15 NOTE — ANESTHESIA CARE TRANSFER NOTE
Patient: Derrell Beebe    Procedure: Procedure(s):  DIRECT LARYNGOSCOPY  Diagnostic upper endoscopy       Diagnosis: Esophageal stenosis [K22.2]  Diagnosis Additional Information: No value filed.    Anesthesia Type:   General     Note:    Oropharynx: oropharynx clear of all foreign objects and spontaneously breathing  Level of Consciousness: awake  Oxygen Supplementation: nasal cannula  Level of Supplemental Oxygen (L/min / FiO2): 2  Independent Airway: airway patency satisfactory and stable  Dentition: dentition unchanged  Vital Signs Stable: post-procedure vital signs reviewed and stable  Report to RN Given: handoff report given  Patient transferred to: PACU    Handoff Report: Identifed the Patient, Identified the Reponsible Provider, Reviewed the pertinent medical history, Discussed the surgical course, Reviewed Intra-OP anesthesia mangement and issues during anesthesia, Set expectations for post-procedure period and Allowed opportunity for questions and acknowledgement of understanding      Vitals:  Vitals Value Taken Time   BP     Temp     Pulse 73 03/15/23 1645   Resp 12 03/15/23 1645   SpO2 100 % 03/15/23 1645   Vitals shown include unvalidated device data.    Electronically Signed By: EVON Randall CRNA  March 15, 2023  4:47 PM
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
## 2023-03-17 ENCOUNTER — PATIENT OUTREACH (OUTPATIENT)
Dept: OTOLARYNGOLOGY | Facility: CLINIC | Age: 58
End: 2023-03-17
Payer: COMMERCIAL

## 2023-03-17 NOTE — PROGRESS NOTES
Called patient to see how his surgery went yesterday. He said that his surgery went very well and he is starting to be able to eat solid foods like pizza without pain. He did say that his throat was a little sore but nothing unmanageable. I scheduled a follow up appointment patient was agreeable and verbalized understanding. Viridiana White RN on 3/17/2023 at 2:21 PM

## 2023-03-17 NOTE — OP NOTE
Operative Note   Otolaryngology - Head and Neck Surgery       DATE OF OPERATION:   March 15, 2023    PREOPERATIVE DIAGNOSIS:   Esophageal stenosis   Dysphagia     POSTOPERATIVE DIAGNOSIS:   Same    NAME OF OPERATION:   1. Microdirect Laryngoscopy  2. Transoral Flexible Esophagoscopy    ANESTHESIA  Type: General   ETT: 6.0 silvia MLT    SURGEON:   Diana Oviedo MD    CO-SURGEON:  Porfirio aBnegas MD    RESIDENT SURGEON(S):   Debra Jefferson MD    INDICATIONS FOR PROCEDURE:   The patient is a 57 year old male with caustic esophageal stenosis. The patient comes in for diagnostic esophagoscopy. The risks and benefits of the surgery were discussed. The patient wishes to proceed with surgery and has signed an informed consent.     FINDINGS:   1. Exposure was achieved with a dedo laryngoscope  2. Small scar band at the entrance of the UES, able to pass pediatric esophagoscope without issues through the UES  3. Long segment stenosis     DESCRIPTION OF PROCEDURE:   The patient was brought into the operating room and placed supine on the operating room table. A time-out was performed. General anesthesia was induced. The patient was an easy mask ventilated. Then the patient was intubated with a 6.0 silvia ETT.     Then the patient was turned 90 degrees from the anesthesiologist. The head was drapped in the usual fashion. Then the dentition and mucosa were inspected prior to start. A reinforced tooth guard was placed on the upper dentition. The dedo laryngoscope was carefully introduced into the oral cavity and gently passed to the oropharynx. Here the postcricoid area and the cricopharyngeus muscle could not be exposed. Multiple attempts were made. The patient was placed in suspension with exposure of the postcricoid.    This revealed small scar band not obstructive at the entrance of the UES. Photodocumentation was performed.     Then Dr Banegas passed a flexible pediatric esophagoscope. Please see his dictation.     This was  the end of our procedure. Afrin soaked pledgets were applied to the surgical site for hemostasis.  The surgical site was then inspected and no residual bleeding was seen. The patient was taken out of suspension. The instrumentation was carefully removed, examining the mucosa and dentition on the way it. The dental guard was removed, and the mucosa and dentition were seen to be in their preoperative state at the conclusion of the procedure. The patient was then handed back to the care of anesthesia who awoke and extubated the patient without complication.    COMPLICATIONS:   None.  .   ESTIMATED BLOOD LOSS:   Less than 10cc    DISPOSITION:   PACU.    SPECIMENS:  * No specimens in log *       PHOTODOCUMENTATION:

## 2023-05-14 ENCOUNTER — HEALTH MAINTENANCE LETTER (OUTPATIENT)
Age: 58
End: 2023-05-14

## 2024-07-21 ENCOUNTER — HEALTH MAINTENANCE LETTER (OUTPATIENT)
Age: 59
End: 2024-07-21

## 2025-08-10 ENCOUNTER — HEALTH MAINTENANCE LETTER (OUTPATIENT)
Age: 60
End: 2025-08-10

## (undated) DEVICE — JELLY LUBRICATING SURGILUBE 2OZ TUBE

## (undated) DEVICE — GLOVE BIOGEL PI ULTRATOUCH SZ 6.5 41165

## (undated) DEVICE — TUBING SUCTION MEDI-VAC SOFT 3/16"X20' N520A

## (undated) DEVICE — STRAP UNIVERSAL POSITIONING 2-PIECE 4X47X76" 91-287

## (undated) DEVICE — ENDO BITE BLOCK ADULT OMNI-BLOC

## (undated) DEVICE — KIT CONNECTOR FOR OLYMPUS ENDOSCOPES DEFENDO 100310

## (undated) DEVICE — KIT ENDO FIRST STEP DISINFECTANT 200ML W/POUCH EP-4

## (undated) DEVICE — ENDO CAP AND TUBING STERILE FOR ENDOGATOR  100130

## (undated) DEVICE — GLOVE BIOGEL PI ULTRATOUCH G SZ 7.5 42175

## (undated) DEVICE — ENDO TUBING CO2 SMARTCAP STERILE DISP 100145CO2EXT

## (undated) DEVICE — SOL WATER IRRIG 1000ML BOTTLE 2F7114

## (undated) DEVICE — PAD CHUX UNDERPAD 23X24" 7136

## (undated) DEVICE — PACK ENT ENDOSCOPY UMMC

## (undated) DEVICE — SUCTION MANIFOLD NEPTUNE 2 SYS 4 PORT 0702-020-000

## (undated) RX ORDER — LIDOCAINE HYDROCHLORIDE 20 MG/ML
SOLUTION OROPHARYNGEAL
Status: DISPENSED
Start: 2023-03-07

## (undated) RX ORDER — FENTANYL CITRATE 50 UG/ML
INJECTION, SOLUTION INTRAMUSCULAR; INTRAVENOUS
Status: DISPENSED
Start: 2023-03-15